# Patient Record
Sex: FEMALE | Race: BLACK OR AFRICAN AMERICAN | NOT HISPANIC OR LATINO
[De-identification: names, ages, dates, MRNs, and addresses within clinical notes are randomized per-mention and may not be internally consistent; named-entity substitution may affect disease eponyms.]

---

## 2023-06-09 ENCOUNTER — APPOINTMENT (OUTPATIENT)
Dept: ORTHOPEDIC SURGERY | Facility: CLINIC | Age: 58
End: 2023-06-09
Payer: COMMERCIAL

## 2023-06-09 ENCOUNTER — NON-APPOINTMENT (OUTPATIENT)
Age: 58
End: 2023-06-09

## 2023-06-09 VITALS — BODY MASS INDEX: 26.52 KG/M2 | WEIGHT: 165 LBS | HEIGHT: 66 IN

## 2023-06-09 DIAGNOSIS — M50.90 CERVICAL DISC DISORDER, UNSPECIFIED, UNSPECIFIED CERVICAL REGION: ICD-10-CM

## 2023-06-09 DIAGNOSIS — E78.00 PURE HYPERCHOLESTEROLEMIA, UNSPECIFIED: ICD-10-CM

## 2023-06-09 DIAGNOSIS — J45.909 UNSPECIFIED ASTHMA, UNCOMPLICATED: ICD-10-CM

## 2023-06-09 DIAGNOSIS — M77.12 LATERAL EPICONDYLITIS, LEFT ELBOW: ICD-10-CM

## 2023-06-09 PROBLEM — Z00.00 ENCOUNTER FOR PREVENTIVE HEALTH EXAMINATION: Status: ACTIVE | Noted: 2023-06-09

## 2023-06-09 PROCEDURE — 73010 X-RAY EXAM OF SHOULDER BLADE: CPT | Mod: RT

## 2023-06-09 PROCEDURE — 99204 OFFICE O/P NEW MOD 45 MIN: CPT

## 2023-06-09 PROCEDURE — 73030 X-RAY EXAM OF SHOULDER: CPT | Mod: RT

## 2023-06-09 PROCEDURE — 72040 X-RAY EXAM NECK SPINE 2-3 VW: CPT

## 2023-06-09 RX ORDER — METHYLPREDNISOLONE 4 MG/1
4 TABLET ORAL
Qty: 1 | Refills: 0 | Status: ACTIVE | COMMUNITY
Start: 2023-06-09 | End: 1900-01-01

## 2023-06-09 NOTE — PHYSICAL EXAM
[5 ___] : forward flexion 5[unfilled]/5 [Left] : left elbow [NL (150)] : flexion 150 degrees [NL (0)] : extension 0 degrees [Pain with Extension] : pain with extension [5___] : extension 5[unfilled]/5 [FreeTextEntry9] : \par ER 60 [] : no tenderness over medial epicondyle

## 2023-06-09 NOTE — ASSESSMENT
[FreeTextEntry1] : L lateral epicondylitis, Cervical DDD with signs of cervical radiculopathy.\par MDP.\par Heat.\par HEP demonstrated.\par Tennis elbow brace.\par RTW 6/10/23.\par Pain management for c-spine if not improved.\par RTO for elbow prn.\par

## 2023-06-09 NOTE — IMAGING
[Disc space narrowing] : Disc space narrowing [Right] : right shoulder [Degenerative change] : Degenerative change [There are no fractures, subluxations or dislocations. No significant abnormalities are seen] : There are no fractures, subluxations or dislocations. No significant abnormalities are seen

## 2023-06-09 NOTE — HISTORY OF PRESENT ILLNESS
[Sudden] : sudden [10] : 10 [2] : 2 [Dull/Aching] : dull/aching [Throbbing] : throbbing [Tightness] : tightness [Frequent] : frequent [Leisure] : leisure [Work] : work [Sleep] : sleep [Lying in bed] : lying in bed [de-identified] : 6/9/23: 56 yo RHD female with left elbow, shoulder and neck pain since about 5/25/23. She reports tripping and hitting her arm into a gate. There is pain radiating up her arm to her neck. There is numbness/tingling. She went to  for xrays. She took Aleve. No prior injuries. [] : Post Surgical Visit: no [FreeTextEntry1] : Left Shoulder [FreeTextEntry3] : 2 [FreeTextEntry5] : Patient DAVI MCHUGH is 57 years old and is being evaluated for their Left arm. Patient hit their elbow on a gate and started to experience pain in the entire left arm. [FreeTextEntry7] : Pain moves from neck down to fingertips [de-identified] : Holding items to long/ Overuse [de-identified] : 06/08/23 [de-identified] : none.